# Patient Record
Sex: FEMALE | Race: WHITE | NOT HISPANIC OR LATINO | Employment: FULL TIME | URBAN - METROPOLITAN AREA
[De-identification: names, ages, dates, MRNs, and addresses within clinical notes are randomized per-mention and may not be internally consistent; named-entity substitution may affect disease eponyms.]

---

## 2023-03-30 LAB
CHLAMYDIA TRACH., AMPLIFIED: NEGATIVE
N. GONORRHEA, AMPLIFIED: NEGATIVE

## 2024-11-01 ENCOUNTER — APPOINTMENT (OUTPATIENT)
Dept: OBSTETRICS AND GYNECOLOGY | Facility: CLINIC | Age: 24
End: 2024-11-01
Payer: COMMERCIAL

## 2024-11-01 VITALS
HEIGHT: 67 IN | WEIGHT: 286 LBS | DIASTOLIC BLOOD PRESSURE: 70 MMHG | BODY MASS INDEX: 44.89 KG/M2 | SYSTOLIC BLOOD PRESSURE: 102 MMHG

## 2024-11-01 DIAGNOSIS — R10.2 PELVIC PAIN: ICD-10-CM

## 2024-11-01 DIAGNOSIS — N89.8 VAGINAL DISCHARGE: ICD-10-CM

## 2024-11-01 DIAGNOSIS — Z01.419 WELL WOMAN EXAM WITH ROUTINE GYNECOLOGICAL EXAM: Primary | ICD-10-CM

## 2024-11-01 PROBLEM — N91.5 OLIGOMENORRHEA: Status: RESOLVED | Noted: 2024-11-01 | Resolved: 2024-11-01

## 2024-11-01 PROBLEM — E55.9 VITAMIN D DEFICIENCY: Status: RESOLVED | Noted: 2024-11-01 | Resolved: 2024-11-01

## 2024-11-01 PROBLEM — F41.9 ANXIETY: Status: ACTIVE | Noted: 2018-07-06

## 2024-11-01 PROBLEM — R79.89 ELEVATED TESTOSTERONE LEVEL IN FEMALE: Status: ACTIVE | Noted: 2024-11-01

## 2024-11-01 PROBLEM — L68.0 HIRSUTISM: Status: ACTIVE | Noted: 2024-11-01

## 2024-11-01 PROBLEM — E66.01 MORBID OBESITY (MULTI): Status: ACTIVE | Noted: 2024-11-01

## 2024-11-01 PROBLEM — N91.2 AMENORRHEA: Status: RESOLVED | Noted: 2024-11-01 | Resolved: 2024-11-01

## 2024-11-01 PROBLEM — L83 ACANTHOSIS NIGRICANS: Status: ACTIVE | Noted: 2024-11-01

## 2024-11-01 PROBLEM — I10 HIGH BLOOD PRESSURE: Status: ACTIVE | Noted: 2024-11-01

## 2024-11-01 PROCEDURE — 1036F TOBACCO NON-USER: CPT | Performed by: OBSTETRICS & GYNECOLOGY

## 2024-11-01 PROCEDURE — 87661 TRICHOMONAS VAGINALIS AMPLIF: CPT

## 2024-11-01 PROCEDURE — 3008F BODY MASS INDEX DOCD: CPT | Performed by: OBSTETRICS & GYNECOLOGY

## 2024-11-01 PROCEDURE — 3078F DIAST BP <80 MM HG: CPT | Performed by: OBSTETRICS & GYNECOLOGY

## 2024-11-01 PROCEDURE — 87491 CHLMYD TRACH DNA AMP PROBE: CPT

## 2024-11-01 PROCEDURE — 99395 PREV VISIT EST AGE 18-39: CPT | Performed by: OBSTETRICS & GYNECOLOGY

## 2024-11-01 PROCEDURE — 87205 SMEAR GRAM STAIN: CPT

## 2024-11-01 PROCEDURE — 87591 N.GONORRHOEAE DNA AMP PROB: CPT

## 2024-11-01 PROCEDURE — 3074F SYST BP LT 130 MM HG: CPT | Performed by: OBSTETRICS & GYNECOLOGY

## 2024-11-01 RX ORDER — LEVONORGESTREL 52 MG/1
INTRAUTERINE DEVICE INTRAUTERINE
COMMUNITY

## 2024-11-01 ASSESSMENT — ENCOUNTER SYMPTOMS
FEVER: 0
ABDOMINAL PAIN: 1
WEIGHT LOSS: 0
BELCHING: 0
CONSTIPATION: 0
VOMITING: 0
ARTHRALGIAS: 0
NAUSEA: 0
MYALGIAS: 0
FLATUS: 0
HEMATURIA: 0
HEMATOCHEZIA: 0
DIARRHEA: 0
FREQUENCY: 0
HEADACHES: 0
ANOREXIA: 0
DYSURIA: 0

## 2024-11-02 LAB
C TRACH RRNA SPEC QL NAA+PROBE: NEGATIVE
CLUE CELLS VAG LPF-#/AREA: NORMAL /[LPF]
N GONORRHOEA DNA SPEC QL PROBE+SIG AMP: NEGATIVE
NUGENT SCORE: 0
T VAGINALIS RRNA SPEC QL NAA+PROBE: NEGATIVE
YEAST VAG WET PREP-#/AREA: NORMAL

## 2024-11-07 ENCOUNTER — HOSPITAL ENCOUNTER (OUTPATIENT)
Dept: RADIOLOGY | Facility: CLINIC | Age: 24
Discharge: HOME | End: 2024-11-07
Payer: COMMERCIAL

## 2024-11-07 DIAGNOSIS — R10.2 PELVIC PAIN: ICD-10-CM

## 2024-11-07 PROCEDURE — 76856 US EXAM PELVIC COMPLETE: CPT

## 2024-11-11 DIAGNOSIS — N39.3 STRESS INCONTINENCE IN FEMALE: ICD-10-CM

## 2024-11-11 DIAGNOSIS — R10.2 PELVIC PAIN: Primary | ICD-10-CM

## 2025-01-28 ENCOUNTER — OFFICE VISIT (OUTPATIENT)
Dept: OBSTETRICS AND GYNECOLOGY | Facility: CLINIC | Age: 25
End: 2025-01-28
Payer: COMMERCIAL

## 2025-01-28 VITALS
WEIGHT: 283.2 LBS | HEIGHT: 67 IN | OXYGEN SATURATION: 97 % | RESPIRATION RATE: 16 BRPM | DIASTOLIC BLOOD PRESSURE: 87 MMHG | HEART RATE: 76 BPM | TEMPERATURE: 96.1 F | SYSTOLIC BLOOD PRESSURE: 133 MMHG | BODY MASS INDEX: 44.45 KG/M2

## 2025-01-28 DIAGNOSIS — R10.2 PELVIC PAIN: ICD-10-CM

## 2025-01-28 DIAGNOSIS — Z13.89 SCREENING FOR BLOOD OR PROTEIN IN URINE: ICD-10-CM

## 2025-01-28 DIAGNOSIS — N39.3 STRESS INCONTINENCE IN FEMALE: Primary | ICD-10-CM

## 2025-01-28 LAB
POC APPEARANCE, URINE: CLEAR
POC BILIRUBIN, URINE: NEGATIVE
POC BLOOD, URINE: NEGATIVE
POC COLOR, URINE: YELLOW
POC GLUCOSE, URINE: NEGATIVE MG/DL
POC KETONES, URINE: NEGATIVE MG/DL
POC LEUKOCYTES, URINE: ABNORMAL
POC NITRITE,URINE: NEGATIVE
POC PH, URINE: 6 PH
POC PROTEIN, URINE: ABNORMAL MG/DL
POC SPECIFIC GRAVITY, URINE: >=1.03
POC UROBILINOGEN, URINE: 0.2 EU/DL

## 2025-01-28 PROCEDURE — 1036F TOBACCO NON-USER: CPT | Performed by: NURSE PRACTITIONER

## 2025-01-28 PROCEDURE — 99213 OFFICE O/P EST LOW 20 MIN: CPT | Mod: 25 | Performed by: NURSE PRACTITIONER

## 2025-01-28 PROCEDURE — 3075F SYST BP GE 130 - 139MM HG: CPT | Performed by: NURSE PRACTITIONER

## 2025-01-28 PROCEDURE — 81003 URINALYSIS AUTO W/O SCOPE: CPT | Mod: QW | Performed by: NURSE PRACTITIONER

## 2025-01-28 PROCEDURE — 99203 OFFICE O/P NEW LOW 30 MIN: CPT | Performed by: NURSE PRACTITIONER

## 2025-01-28 PROCEDURE — 51798 US URINE CAPACITY MEASURE: CPT | Performed by: NURSE PRACTITIONER

## 2025-01-28 PROCEDURE — 3008F BODY MASS INDEX DOCD: CPT | Performed by: NURSE PRACTITIONER

## 2025-01-28 PROCEDURE — 3079F DIAST BP 80-89 MM HG: CPT | Performed by: NURSE PRACTITIONER

## 2025-01-28 SDOH — ECONOMIC STABILITY: FOOD INSECURITY: WITHIN THE PAST 12 MONTHS, THE FOOD YOU BOUGHT JUST DIDN'T LAST AND YOU DIDN'T HAVE MONEY TO GET MORE.: NEVER TRUE

## 2025-01-28 SDOH — ECONOMIC STABILITY: FOOD INSECURITY: WITHIN THE PAST 12 MONTHS, YOU WORRIED THAT YOUR FOOD WOULD RUN OUT BEFORE YOU GOT MONEY TO BUY MORE.: NEVER TRUE

## 2025-01-28 ASSESSMENT — COLUMBIA-SUICIDE SEVERITY RATING SCALE - C-SSRS
1. IN THE PAST MONTH, HAVE YOU WISHED YOU WERE DEAD OR WISHED YOU COULD GO TO SLEEP AND NOT WAKE UP?: NO
2. HAVE YOU ACTUALLY HAD ANY THOUGHTS OF KILLING YOURSELF?: NO
6. HAVE YOU EVER DONE ANYTHING, STARTED TO DO ANYTHING, OR PREPARED TO DO ANYTHING TO END YOUR LIFE?: NO

## 2025-01-28 ASSESSMENT — PATIENT HEALTH QUESTIONNAIRE - PHQ9
2. FEELING DOWN, DEPRESSED OR HOPELESS: NOT AT ALL
SUM OF ALL RESPONSES TO PHQ9 QUESTIONS 1 AND 2: 0
1. LITTLE INTEREST OR PLEASURE IN DOING THINGS: NOT AT ALL

## 2025-01-28 ASSESSMENT — LIFESTYLE VARIABLES
HOW OFTEN DO YOU HAVE SIX OR MORE DRINKS ON ONE OCCASION: NEVER
HOW MANY STANDARD DRINKS CONTAINING ALCOHOL DO YOU HAVE ON A TYPICAL DAY: 1 OR 2
SKIP TO QUESTIONS 9-10: 1
AUDIT-C TOTAL SCORE: 1
HOW OFTEN DO YOU HAVE A DRINK CONTAINING ALCOHOL: MONTHLY OR LESS

## 2025-01-28 ASSESSMENT — ENCOUNTER SYMPTOMS
LOSS OF SENSATION IN FEET: 0
DEPRESSION: 0

## 2025-01-28 ASSESSMENT — PAIN SCALES - GENERAL: PAINLEVEL_OUTOF10: 0-NO PAIN

## 2025-01-28 NOTE — LETTER
2025     Teresita Sher MD  [address] [facility], [address]  [place] [address]    Patient: Shilpa Schmidt   YOB: 2000   Date of Visit: 2025       Dear Dr. Teresita Sher MD:    Thank you for referring Shilpa Schmidt to me for evaluation. Below are my notes for this consultation.  If you have questions, please do not hesitate to call me. I look forward to following your patient along with you.       Sincerely,     Halima Novak, APRN-CNP      CC: No Recipients  ______________________________________________________________________________________    Shilpa Schmidt is a 25 y.o. referred by Dr. Teresita Sher for NORMA and pelvic pain.     Chief Complaint: pelvic pain, NORMA     Pelvic U/S 24:   - An intrauterine device is satisfactorily positioned within the uterus. Ovaries: Duplex Doppler interrogation of each ovary including color flow and spectral  waveform analysis shows normal arterial and venous flow without torsion. Right ovary: 4.6 cm x 4.3 cm x 4.7 cm Left ovary: 4.6 cm x 2.9 cm  x 4.3 cm. There is a 0.8 x 1.3 x 1.7 cm dominant follicle within left ovary.    OB/GYN History:   -   - Mirena IUD in place.   - Pap up to date: Yes - 3/28/23 NILM  - Sexually active:  Yes. No pain with female partners. Endorses pain with male partners.   Dyspareunia: Yes, with deep belly pain. She has vomited during intercourse due to the extreme pain.   - Endorses B/L lower pelvic pain.   - She went to a PF workshop and PFPT advised that she see a doc for leakage symptoms    Prolapse symptoms:   - denies prolapse symptoms   - denies sense of incomplete emptying    Urinary symptoms:   - NORMA: Yes.   - Her leakage has worsened over the past 1-1.5 years. She used to think she was sweating, but then realized it was leaking. She will leak through her pants and has changed pants multiple times throughout a work day. She started wearing pads and goes through  "2-3 pads in 24 hours.   - UUI: No, but she endorses urgency.   - Frequency: No  - Nocturia: No  - She wakes up wet, but thinks this is sweat.   - 0 culture proven UTIs in past year   - Excessive fluid intake: Drinks 24 oz of diet coke and 16 oz of water  - She goes to the bathroom 3-5x daily, but this is mainly for her bowels.   - She does endorse chafing if she doesn't change her pants for a while - discussed coating the skin in Aquaphor or Vaseline to protect the skin from pads or pants     Bowels  - BMs twice daily at least, [place] scale 4-7.   - She's had a colonoscopy.   - Denies FI.     Past medical and surgical hx reviewed - pertinent for PCOS dx in 2011, Anovulation, HTN     No LMP recorded (lmp unknown). (Menstrual status: IUD).  Body mass index is 44.36 kg/m².  /87   Pulse 76   Temp 35.6 °C (96.1 °F) (Temporal)   Resp 16   Ht 1.702 m (5' 7\")   Wt 128 kg (283 lb 3.2 oz)   LMP  (LMP Unknown) Comment: Patient does not get period. last period was 2019. pearl has IUD  SpO2 97%   BMI 44.36 kg/m²     Physical Exam  Constitutional:       Appearance: Normal appearance. She is normal weight.   Genitourinary:      Vulva, bladder, rectum and urethral meatus normal.      No vaginal prolapse present.     Pelvic floor neuro is intact.  POP-Q measurements were:      Aa: Ba: C:      gH: 0.5, pB: TVL:      Ap: Bp: D:   Pulmonary:      Effort: Pulmonary effort is normal.   Neurological:      Mental Status: She is alert.   Psychiatric:         Mood and Affect: Mood normal.         Behavior: Behavior normal.         Thought Content: Thought content normal.         Judgment: Judgment normal.     PVR (by Ultrasound): 62 ml    Assessment and Plan  25 y.o. female being assessed for NORMA, possible insensate urine loss, MPP, and diarrhea. Co morbidities: PCOS dx in 2011, Anovulation, HTN.     Diagnoses:   #1 NORMA  #2 Possible insensate urine loss   #3 Diarrhea   #4 Myofascial pelvic pain      Plan:   1. NORMA   - We " discussed lifestyle changes (i.e., limiting fluids to 60oz in total per day, timed voiding every 2-3 hours, avoiding bladder irritants).  - Reviewed options for stress urinary incontinence including pelvic floor physical therapy, pessary use, and anti-incontinence procedures (urethral bulking and MUS placement). She can also wear a tampon at work and see if that improves her NORMA.   - Patient plans to attend PFPT and do the tampon test. If tampon works, we can try a pessary.    2. Possible insensate urine loss   - For sweat vs incontinence, she was advised to take Azo. If the fluid is orange, she is leaking urine.     3. Diarrhea, soft stool  - Advised her to add a fiber supplement such as Benefiber or Metamucil. Up titrate dosage PRN. Start with 1/2 to 1 tsp each AM and work up to the full dose.     4. Myofascial pelvic pain   - PFPT requisition sent today. Given list of physical therapists with their corresponding locations/contact information. If she can't tolerate PFPT, we can consider suppositories or injections to help her better tolerate PT.   - Declined referral to Dr. Chasidy Soliman or Dr. Ann Benoit but will keep them in mind if pain is too much to tolerate during physical therapy     Follow-up with JAG De Dios in 3-4 months or sooner if she desires pessary fitting    Scribe Attestation:   Mindi BATISTA, am scribing for virtually, and in the presence of JAG De Dios on 01/28/2025 at 10:37 AM.     I, Halima Novak, personally performed the services described in the documentation as scribed in my presence and confirm it is both complete and accurate.

## 2025-01-28 NOTE — PATIENT INSTRUCTIONS
"To reach the Urogynecology nurses for Dr. Correa and Halima Novak, call [phone]. You will then select option 2 to be connected to the your provider's office and then option 3 for \"[facility]\". This will connect you with Ally or Merced Hernandez.     Stress Incontinence:  Pelvic floor PT  Timed voiding every 2 hours  Try tampon test, if this works we know that a pessary would work  Pryidium (Azo) OTC to see if wetness at night is urine or sweat    Pelvic pain:  Pelvic floor PT  Maninder Soliman or Ann Benoit are pain specialists within the department of OBGYN    Bowels  Benefiber or Metamucil, start with 1/2 to 1 tsp each AM and work up to the full dose  "

## 2025-01-28 NOTE — PROGRESS NOTES
Shilpa Schmidt is a 25 y.o. referred by Dr. Teresita Sher for NORMA and pelvic pain.     Chief Complaint: pelvic pain, NORMA     Pelvic U/S 24:   - An intrauterine device is satisfactorily positioned within the uterus. Ovaries: Duplex Doppler interrogation of each ovary including color flow and spectral  waveform analysis shows normal arterial and venous flow without torsion. Right ovary: 4.6 cm x 4.3 cm x 4.7 cm Left ovary: 4.6 cm x 2.9 cm  x 4.3 cm. There is a 0.8 x 1.3 x 1.7 cm dominant follicle within left ovary.    OB/GYN History:   -   - Mirena IUD in place.   - Pap up to date: Yes - 3/28/23 NILM  - Sexually active:  Yes. No pain with female partners. Endorses pain with male partners.   Dyspareunia: Yes, with deep belly pain. She has vomited during intercourse due to the extreme pain.   - Endorses B/L lower pelvic pain.   - She went to a PF workshop and PFPT advised that she see a doc for leakage symptoms    Prolapse symptoms:   - denies prolapse symptoms   - denies sense of incomplete emptying    Urinary symptoms:   - NORMA: Yes.   - Her leakage has worsened over the past 1-1.5 years. She used to think she was sweating, but then realized it was leaking. She will leak through her pants and has changed pants multiple times throughout a work day. She started wearing pads and goes through 2-3 pads in 24 hours.   - UUI: No, but she endorses urgency.   - Frequency: No  - Nocturia: No  - She wakes up wet, but thinks this is sweat.   - 0 culture proven UTIs in past year   - Excessive fluid intake: Drinks 24 oz of diet coke and 16 oz of water  - She goes to the bathroom 3-5x daily, but this is mainly for her bowels.   - She does endorse chafing if she doesn't change her pants for a while - discussed coating the skin in Aquaphor or Vaseline to protect the skin from pads or pants     Bowels  - BMs twice daily at least, [place] scale 4-7.   - She's had a colonoscopy.   - Denies FI.     Past medical and surgical  "hx reviewed - pertinent for PCOS dx in 2011, Anovulation, HTN     No LMP recorded (lmp unknown). (Menstrual status: IUD).  Body mass index is 44.36 kg/m².  /87   Pulse 76   Temp 35.6 °C (96.1 °F) (Temporal)   Resp 16   Ht 1.702 m (5' 7\")   Wt 128 kg (283 lb 3.2 oz)   LMP  (LMP Unknown) Comment: Patient does not get period. last period was 2019. pearl has IUD  SpO2 97%   BMI 44.36 kg/m²     Physical Exam  Constitutional:       Appearance: Normal appearance. She is normal weight.   Genitourinary:      Vulva, bladder, rectum and urethral meatus normal.      No vaginal prolapse present.     Pelvic floor neuro is intact.  POP-Q measurements were:      Aa: Ba: C:      gH: 0.5, pB: TVL:      Ap: Bp: D:   Pulmonary:      Effort: Pulmonary effort is normal.   Neurological:      Mental Status: She is alert.   Psychiatric:         Mood and Affect: Mood normal.         Behavior: Behavior normal.         Thought Content: Thought content normal.         Judgment: Judgment normal.     PVR (by Ultrasound): 62 ml    Assessment and Plan  25 y.o. female being assessed for NORMA, possible insensate urine loss, MPP, and diarrhea. Co morbidities: PCOS dx in 2011, Anovulation, HTN.     Diagnoses:   #1 NORMA  #2 Possible insensate urine loss   #3 Diarrhea   #4 Myofascial pelvic pain      Plan:   1. NORMA   - We discussed lifestyle changes (i.e., limiting fluids to 60oz in total per day, timed voiding every 2-3 hours, avoiding bladder irritants).  - Reviewed options for stress urinary incontinence including pelvic floor physical therapy, pessary use, and anti-incontinence procedures (urethral bulking and MUS placement). She can also wear a tampon at work and see if that improves her NORMA.   - Patient plans to attend PFPT and do the tampon test. If tampon works, we can try a pessary.    2. Possible insensate urine loss   - For sweat vs incontinence, she was advised to take Azo. If the fluid is orange, she is leaking urine.     3. " Diarrhea, soft stool  - Advised her to add a fiber supplement such as Benefiber or Metamucil. Up titrate dosage PRN. Start with 1/2 to 1 tsp each AM and work up to the full dose.     4. Myofascial pelvic pain   - PFPT requisition sent today. Given list of physical therapists with their corresponding locations/contact information. If she can't tolerate PFPT, we can consider suppositories or injections to help her better tolerate PT.   - Declined referral to Dr. Chasidy Soliman or Dr. Ann Benoit but will keep them in mind if pain is too much to tolerate during physical therapy     Follow-up with JAG De Dios in 3-4 months or sooner if she desires pessary fitting    Scribe Attestation:   IMindi, am scribing for virtually, and in the presence of JAG De Dios on 01/28/2025 at 10:37 AM.     I, Halima Novak, personally performed the services described in the documentation as scribed in my presence and confirm it is both complete and accurate.